# Patient Record
Sex: FEMALE | Race: WHITE | NOT HISPANIC OR LATINO | URBAN - METROPOLITAN AREA
[De-identification: names, ages, dates, MRNs, and addresses within clinical notes are randomized per-mention and may not be internally consistent; named-entity substitution may affect disease eponyms.]

---

## 2023-07-11 LAB
FERRITIN (UG/LL) IN SER/PLAS: 14 UG/L (ref 8–150)
IRON (UG/DL) IN SER/PLAS: 40 UG/DL (ref 35–150)
IRON BINDING CAPACITY (UG/DL) IN SER/PLAS: 482 UG/DL (ref 240–445)
IRON SATURATION (%) IN SER/PLAS: 8 % (ref 25–45)

## 2024-09-11 ENCOUNTER — OFFICE VISIT (OUTPATIENT)
Dept: URGENT CARE | Age: 44
End: 2024-09-11
Payer: COMMERCIAL

## 2024-09-11 VITALS
OXYGEN SATURATION: 99 % | SYSTOLIC BLOOD PRESSURE: 96 MMHG | HEIGHT: 64 IN | RESPIRATION RATE: 16 BRPM | TEMPERATURE: 98.3 F | HEART RATE: 69 BPM | BODY MASS INDEX: 30.05 KG/M2 | DIASTOLIC BLOOD PRESSURE: 61 MMHG | WEIGHT: 176 LBS

## 2024-09-11 DIAGNOSIS — M54.6 ACUTE LEFT-SIDED THORACIC BACK PAIN: Primary | ICD-10-CM

## 2024-09-11 LAB
POC APPEARANCE, URINE: CLEAR
POC BILIRUBIN, URINE: NEGATIVE
POC BLOOD, URINE: NEGATIVE
POC COLOR, URINE: YELLOW
POC GLUCOSE, URINE: NEGATIVE MG/DL
POC KETONES, URINE: NEGATIVE MG/DL
POC LEUKOCYTES, URINE: NEGATIVE
POC NITRITE,URINE: NEGATIVE
POC PH, URINE: 5.5 PH
POC PROTEIN, URINE: NEGATIVE MG/DL
POC SPECIFIC GRAVITY, URINE: 1.02
POC UROBILINOGEN, URINE: 0.2 EU/DL

## 2024-09-11 PROCEDURE — 87086 URINE CULTURE/COLONY COUNT: CPT

## 2024-09-11 ASSESSMENT — PAIN SCALES - GENERAL: PAINLEVEL: 3

## 2024-09-11 NOTE — PROGRESS NOTES
"Subjective   Patient ID: Libra Moeller is a 43 y.o. female. They present today with a chief complaint of Back Pain.    History of Present Illness  HPI  OHF adult presents for midback pain, L side only, for 3d. Was cleaning and organizing her house over the weekend when pain started. Mild, nonradiating, aggravated by posture or trunk movements. Denies dyspnea, cough, pleuritic pain. Denies spinous pain, low back pain, neck pain, urinary sx.     Past Medical History  Allergies as of 09/11/2024 - Reviewed 09/11/2024   Allergen Reaction Noted    Vancomycin Swelling 02/29/2024    Septra [sulfamethoxazole-trimethoprim] Rash 09/11/2024       (Not in a hospital admission)       No past medical history on file.    No past surgical history on file.     reports that she has never smoked. She does not have any smokeless tobacco history on file. She reports that she does not currently use alcohol.    Review of Systems  Review of Systems               Negative except as in HPI.                  Objective    Vitals:    09/11/24 1331   BP: 96/61   BP Location: Right arm   Patient Position: Sitting   BP Cuff Size: Adult   Pulse: 69   Resp: 16   Temp: 36.8 °C (98.3 °F)   SpO2: 99%   Weight: 79.8 kg (176 lb)   Height: 1.613 m (5' 3.5\")     No LMP recorded.    Physical Exam    CONSTITUTIONAL: well-appearing, nontoxic    EYES:  No scleral icterus or orbital trauma noted. No conjunctival injection. PERRLA. EOMI b/l. No nystagmus.    HEENT:  Head and face are unremarkable and atraumatic. Mucous membranes moist. Nares are patent without copious rhinorrhea.  No lymphadenopathy.    LUNGS:  CTAB, no r/r/w. No dullness to percussion.    CARDIOVASCULAR:   Normal dorsalis pedis and posterior tibialis pulses bilaterally.    ABDOMEN:  Nontender, nondistended, with no obvious masses, and no peritoneal signs.  No bruits heard on auscultation, normal bowel sounds in 4 quadrants.     MUSCULOSKELETAL: Moving all extremities. Ankle strength is 5 out of 5 " bilateral flexion and extension.    Neck supple and NTTP.    TTP with palpable spasm L thoracic muscles diffusely. No spinous process TTP.    Lumbosacral spine is NTTP.    Gait nl. Spine FROM wo pain.    SKIN:   Normal skin exam of the abdomen and back.    NEURO:  Normal baseline mental status. Patellar reflexes are normal bilaterally. No decreased motor or sensation in the distal lower extremities bilaterally. No saddle anesthesia.    PSYCH: Appropriate mood and affect.         ---------------------------------------------------------------         MDM:  Reproduceable muscular pain in thoracic region. The Ddx also included renal causes, but UA was completely normal. Advised re. OTC NSAIDs, heat, stretching and will fu here PRN.     The patient was screened for and is not exhibiting any of the concerning signs or symptoms associated with cauda equina syndrome in the history or on the examination today.      Procedures    Point of Care Test & Imaging Results from this visit  Results for orders placed or performed in visit on 09/11/24   POCT UA Automated manually resulted   Result Value Ref Range    POC Color, Urine Yellow Straw, Yellow, Light-Yellow    POC Appearance, Urine Clear Clear    POC Glucose, Urine NEGATIVE NEGATIVE mg/dl    POC Bilirubin, Urine NEGATIVE NEGATIVE    POC Ketones, Urine NEGATIVE NEGATIVE mg/dl    POC Specific Gravity, Urine 1.025 1.005 - 1.035    POC Blood, Urine NEGATIVE NEGATIVE    POC PH, Urine 5.5 No Reference Range Established PH    POC Protein, Urine NEGATIVE NEGATIVE, 30 (1+) mg/dl    POC Urobilinogen, Urine 0.2 0.2, 1.0 EU/DL    Poc Nitrite, Urine NEGATIVE NEGATIVE    POC Leukocytes, Urine NEGATIVE NEGATIVE     No results found.    Diagnostic study results (if any) were reviewed by Rainer Hedrick PA-C.    Assessment/Plan   Allergies, medications, history, and pertinent labs/EKGs/Imaging reviewed by Rainer Hedrick PA-C.       Orders and Diagnoses  Diagnoses and all orders for this  visit:  Acute left-sided thoracic back pain  -     POCT UA Automated manually resulted  -     Urine Culture      Medical Admin Record      Follow Up Instructions  No follow-ups on file.    Patient disposition: Home    Electronically signed by Rainer Hedrick PA-C  2:01 PM

## 2024-09-13 LAB — BACTERIA UR CULT: NORMAL
